# Patient Record
Sex: FEMALE | Race: WHITE | URBAN - METROPOLITAN AREA
[De-identification: names, ages, dates, MRNs, and addresses within clinical notes are randomized per-mention and may not be internally consistent; named-entity substitution may affect disease eponyms.]

---

## 2022-12-19 ENCOUNTER — OFFICE VISIT (OUTPATIENT)
Dept: URBAN - METROPOLITAN AREA CLINIC 71 | Facility: CLINIC | Age: 82
End: 2022-12-19
Payer: OTHER GOVERNMENT

## 2022-12-19 DIAGNOSIS — H04.123 DRY EYE SYNDROME OF BILATERAL LACRIMAL GLANDS: Primary | ICD-10-CM

## 2022-12-19 DIAGNOSIS — G43.B1 OPHTHALMOPLEGIC MIGRAINE, INTRACTABLE: ICD-10-CM

## 2022-12-19 DIAGNOSIS — H35.342 MACULAR HOLE OF LEFT EYE: ICD-10-CM

## 2022-12-19 DIAGNOSIS — H52.4 PRESBYOPIA: ICD-10-CM

## 2022-12-19 PROCEDURE — 99203 OFFICE O/P NEW LOW 30 MIN: CPT

## 2022-12-19 ASSESSMENT — INTRAOCULAR PRESSURE
OS: 19
OD: 16

## 2022-12-19 ASSESSMENT — VISUAL ACUITY
OS: 20/30
OD: 20/25

## 2022-12-19 NOTE — IMPRESSION/PLAN
Impression: Presbyopia: H52.4. Plan: Refraction performed but not dispensed today. Explained that a new pair of glasses will not help improve her vision much. Patient expressed understanding.

## 2022-12-19 NOTE — IMPRESSION/PLAN
Impression: Dry eye syndrome of bilateral lacrimal glands: H04.123. Plan: Discussed the nature of the diagnosis with the patient. Strongly recommended patient uses PF AT's 3-4x a day to help their eyes feel more comfortable and to slightly improve and stabilize their vision.

## 2022-12-19 NOTE — IMPRESSION/PLAN
Impression: Macular hole of left eye: H35.342.

 -- Hx of macular hole repair Plan: Discussed diagnosis with patient. Explained that this is one of the reasons for the decrease in her vision. Explained that her vision will be distorted because of the disruption in the retinal structure. Explained that trying to fix the macular hole will more than likely not result in better vision. Instructed patient to RTC in 3 months for [facility] and continue monitoring. Instructed patient to RTC sooner if any issues arise. Patient expressed understanding.

## 2022-12-19 NOTE — IMPRESSION/PLAN
Impression: Ophthalmoplegic migraine, intractable: G43. B1. Plan: Discussed diagnosis with the patient. Encouraged her to get proper amounts of sleep, drink enough water and eat a proper diet. Encouraged patient to RTC if symptoms get worse. Patient expressed understanding.

## 2023-03-20 ENCOUNTER — OFFICE VISIT (OUTPATIENT)
Dept: URBAN - METROPOLITAN AREA CLINIC 71 | Facility: CLINIC | Age: 83
End: 2023-03-20
Payer: MEDICARE

## 2023-03-20 DIAGNOSIS — H04.123 DRY EYE SYNDROME OF BILATERAL LACRIMAL GLANDS: ICD-10-CM

## 2023-03-20 DIAGNOSIS — H35.342 MACULAR CYST, HOLE, OR PSEUDOHOLE, LEFT EYE: Primary | ICD-10-CM

## 2023-03-20 DIAGNOSIS — H52.4 PRESBYOPIA: ICD-10-CM

## 2023-03-20 PROCEDURE — 99212 OFFICE O/P EST SF 10 MIN: CPT

## 2023-03-20 ASSESSMENT — VISUAL ACUITY
OS: 20/30
OD: 20/20

## 2023-03-20 ASSESSMENT — INTRAOCULAR PRESSURE
OD: 19
OS: 19

## 2023-03-20 NOTE — IMPRESSION/PLAN
Impression: Presbyopia: H52.4. Plan: Finalized and dispensed glasses prescription. Explained that it will take about 2-3 weeks to get adjusted to the glasses and to RTC immediately if any issues with the eyes happen or if any issues with the glasses prescription occurs. Patient expressed understanding.

## 2023-03-20 NOTE — IMPRESSION/PLAN
Impression: Dry eye syndrome of bilateral lacrimal glands: H04.123. Plan: Discussed diagnosis. Strongly recommended patient uses AT's 3-4x a day consistently to promote overall health and comfort of the eyes.

## 2023-03-20 NOTE — IMPRESSION/PLAN
Impression: Macular hole of left eye: H35.342.

 -- Hx of macular hole repair Plan: Stable and will continue to monitor.